# Patient Record
Sex: FEMALE | URBAN - METROPOLITAN AREA
[De-identification: names, ages, dates, MRNs, and addresses within clinical notes are randomized per-mention and may not be internally consistent; named-entity substitution may affect disease eponyms.]

---

## 2024-11-11 ENCOUNTER — HOSPITAL ENCOUNTER (OUTPATIENT)
Dept: OCCUPATIONAL THERAPY | Age: 29
Setting detail: THERAPIES SERIES
Discharge: HOME | End: 2024-11-11
Payer: COMMERCIAL

## 2024-11-11 PROCEDURE — 97537 COMMUNITY/WORK REINTEGRATION: CPT

## 2024-11-11 PROCEDURE — 97165 OT EVAL LOW COMPLEX 30 MIN: CPT

## 2024-11-11 NOTE — PROGRESS NOTES
[x]5-8   sec arc [] <5  >201 sec arc         Pursuits [x] No deviations [] 1 deviation, slow [] >1 deviations; loses target   Saccades [x] No  deviations [] 1 deviation, slow [] >1 deviations; loses target         Cognition   [x]No deficit     Road Sign Recognition   [x]7-9 correct []5-7 correct [] <5 correct     Risk Potential for Being Involved in a Motor Vehicle Crash:    Moderate Risk Skills: High Risk Skills:     Heel to shin delayed                Hasn't driven in 10 years.   Limited use of Right side of body     *Indications for Behind the Wheel Assessment: 2+ Moderate Risk items in any single performance area; 4+ Moderate Risk total items in all performance areas, 1+ items in High Risk level category, 4+ High Risk total items in all performance areas = strong indication for driving cessation- consult with certified driving specialist    ON THE ROAD PERFORMANCE:       Summary: * Reintroduced left foot accelerator and left spinner knob    Problems identified on this date at this time:  []Decreased neck/trunk range of motion, peripheral vision affects peripheral field awareness  [x]Decreased upper extremity control affects ability to perform steering and/or secondary controls  [x]Decreased lower motor control affects ability to perform vehicle gas/brake functions  []Decreased depth perception affects following and stopping distance, gap acceptance  []Decreased visual scanning/perception affects traffic scene interpretation and interaction  []Decreased attention affects ability to attend to signage/signals, road markings, traffic hazards  []Decreased processing speed/reaction time affects ability to interpret traffic scenes and respond timely to traffic situations   []No problems identified      RECOMMENDATIONS: *  training, 3 two hour sessions.        LONG TERM GOALS FOR  TRAININ.  Pt will enhance/develop visual spatial awareness, visual, motor and executive functioning skills needed for